# Patient Record
Sex: FEMALE | Race: WHITE | NOT HISPANIC OR LATINO | URBAN - METROPOLITAN AREA
[De-identification: names, ages, dates, MRNs, and addresses within clinical notes are randomized per-mention and may not be internally consistent; named-entity substitution may affect disease eponyms.]

---

## 2017-03-25 ENCOUNTER — INPATIENT (INPATIENT)
Facility: HOSPITAL | Age: 32
LOS: 5 days | Discharge: HOME | End: 2017-03-31
Attending: PSYCHIATRY & NEUROLOGY | Admitting: PSYCHIATRY & NEUROLOGY
Payer: COMMERCIAL

## 2017-03-25 VITALS
DIASTOLIC BLOOD PRESSURE: 103 MMHG | SYSTOLIC BLOOD PRESSURE: 148 MMHG | RESPIRATION RATE: 18 BRPM | OXYGEN SATURATION: 100 % | TEMPERATURE: 98 F

## 2017-03-25 DIAGNOSIS — F29 UNSPECIFIED PSYCHOSIS NOT DUE TO A SUBSTANCE OR KNOWN PHYSIOLOGICAL CONDITION: ICD-10-CM

## 2017-03-25 LAB
ALBUMIN SERPL ELPH-MCNC: 4.5 G/DL — SIGNIFICANT CHANGE UP (ref 3.3–5)
ALP SERPL-CCNC: 45 U/L — SIGNIFICANT CHANGE UP (ref 40–120)
ALT FLD-CCNC: 12 U/L — SIGNIFICANT CHANGE UP (ref 4–33)
AMPHET UR-MCNC: NEGATIVE — SIGNIFICANT CHANGE UP
APAP SERPL-MCNC: < 15 UG/ML — LOW (ref 15–25)
APPEARANCE UR: SIGNIFICANT CHANGE UP
AST SERPL-CCNC: 15 U/L — SIGNIFICANT CHANGE UP (ref 4–32)
BACTERIA # UR AUTO: SIGNIFICANT CHANGE UP
BARBITURATES MEASUREMENT: NEGATIVE — SIGNIFICANT CHANGE UP
BARBITURATES UR SCN-MCNC: NEGATIVE — SIGNIFICANT CHANGE UP
BASOPHILS # BLD AUTO: 0 K/UL — SIGNIFICANT CHANGE UP (ref 0–0.2)
BASOPHILS NFR BLD AUTO: 0 % — SIGNIFICANT CHANGE UP (ref 0–2)
BENZODIAZ SERPL-MCNC: NEGATIVE — SIGNIFICANT CHANGE UP
BENZODIAZ UR-MCNC: POSITIVE — SIGNIFICANT CHANGE UP
BILIRUB SERPL-MCNC: 0.4 MG/DL — SIGNIFICANT CHANGE UP (ref 0.2–1.2)
BILIRUB UR-MCNC: NEGATIVE — SIGNIFICANT CHANGE UP
BLOOD UR QL VISUAL: NEGATIVE — SIGNIFICANT CHANGE UP
BUN SERPL-MCNC: 8 MG/DL — SIGNIFICANT CHANGE UP (ref 7–23)
CALCIUM SERPL-MCNC: 9.1 MG/DL — SIGNIFICANT CHANGE UP (ref 8.4–10.5)
CANNABINOIDS UR-MCNC: NEGATIVE — SIGNIFICANT CHANGE UP
CHLORIDE SERPL-SCNC: 105 MMOL/L — SIGNIFICANT CHANGE UP (ref 98–107)
CO2 SERPL-SCNC: 23 MMOL/L — SIGNIFICANT CHANGE UP (ref 22–31)
COCAINE METAB.OTHER UR-MCNC: NEGATIVE — SIGNIFICANT CHANGE UP
COLOR SPEC: YELLOW — SIGNIFICANT CHANGE UP
CREAT SERPL-MCNC: 0.73 MG/DL — SIGNIFICANT CHANGE UP (ref 0.5–1.3)
EOSINOPHIL # BLD AUTO: 0.01 K/UL — SIGNIFICANT CHANGE UP (ref 0–0.5)
EOSINOPHIL NFR BLD AUTO: 0.1 % — SIGNIFICANT CHANGE UP (ref 0–6)
ETHANOL BLD-MCNC: < 10 MG/DL — SIGNIFICANT CHANGE UP
GLUCOSE SERPL-MCNC: 107 MG/DL — HIGH (ref 70–99)
GLUCOSE UR-MCNC: 50 — SIGNIFICANT CHANGE UP
HCG SERPL-ACNC: < 5 MIU/ML — SIGNIFICANT CHANGE UP
HCT VFR BLD CALC: 41.7 % — SIGNIFICANT CHANGE UP (ref 34.5–45)
HGB BLD-MCNC: 14.7 G/DL — SIGNIFICANT CHANGE UP (ref 11.5–15.5)
IMM GRANULOCYTES NFR BLD AUTO: 0.1 % — SIGNIFICANT CHANGE UP (ref 0–1.5)
KETONES UR-MCNC: SIGNIFICANT CHANGE UP
LEUKOCYTE ESTERASE UR-ACNC: HIGH
LYMPHOCYTES # BLD AUTO: 0.98 K/UL — LOW (ref 1–3.3)
LYMPHOCYTES # BLD AUTO: 13.8 % — SIGNIFICANT CHANGE UP (ref 13–44)
MCHC RBC-ENTMCNC: 30.8 PG — SIGNIFICANT CHANGE UP (ref 27–34)
MCHC RBC-ENTMCNC: 35.3 % — SIGNIFICANT CHANGE UP (ref 32–36)
MCV RBC AUTO: 87.4 FL — SIGNIFICANT CHANGE UP (ref 80–100)
METHADONE UR-MCNC: NEGATIVE — SIGNIFICANT CHANGE UP
MONOCYTES # BLD AUTO: 0.69 K/UL — SIGNIFICANT CHANGE UP (ref 0–0.9)
MONOCYTES NFR BLD AUTO: 9.7 % — SIGNIFICANT CHANGE UP (ref 2–14)
MUCOUS THREADS # UR AUTO: SIGNIFICANT CHANGE UP
NEUTROPHILS # BLD AUTO: 5.4 K/UL — SIGNIFICANT CHANGE UP (ref 1.8–7.4)
NEUTROPHILS NFR BLD AUTO: 76.3 % — SIGNIFICANT CHANGE UP (ref 43–77)
NITRITE UR-MCNC: NEGATIVE — SIGNIFICANT CHANGE UP
OPIATES UR-MCNC: NEGATIVE — SIGNIFICANT CHANGE UP
OXYCODONE UR-MCNC: NEGATIVE — SIGNIFICANT CHANGE UP
PCP UR-MCNC: NEGATIVE — SIGNIFICANT CHANGE UP
PH UR: 6 — SIGNIFICANT CHANGE UP (ref 4.6–8)
PLATELET # BLD AUTO: 216 K/UL — SIGNIFICANT CHANGE UP (ref 150–400)
PMV BLD: 10.6 FL — SIGNIFICANT CHANGE UP (ref 7–13)
POTASSIUM SERPL-MCNC: 4 MMOL/L — SIGNIFICANT CHANGE UP (ref 3.5–5.3)
POTASSIUM SERPL-SCNC: 4 MMOL/L — SIGNIFICANT CHANGE UP (ref 3.5–5.3)
PROT SERPL-MCNC: 6.8 G/DL — SIGNIFICANT CHANGE UP (ref 6–8.3)
PROT UR-MCNC: 20 — SIGNIFICANT CHANGE UP
RBC # BLD: 4.77 M/UL — SIGNIFICANT CHANGE UP (ref 3.8–5.2)
RBC # FLD: 12.1 % — SIGNIFICANT CHANGE UP (ref 10.3–14.5)
RBC CASTS # UR COMP ASSIST: HIGH (ref 0–?)
SALICYLATES SERPL-MCNC: < 5 MG/DL — LOW (ref 15–30)
SODIUM SERPL-SCNC: 140 MMOL/L — SIGNIFICANT CHANGE UP (ref 135–145)
SP GR SPEC: 1.02 — SIGNIFICANT CHANGE UP (ref 1–1.03)
SQUAMOUS # UR AUTO: SIGNIFICANT CHANGE UP
TSH SERPL-MCNC: 2.19 UIU/ML — SIGNIFICANT CHANGE UP (ref 0.27–4.2)
UROBILINOGEN FLD QL: NORMAL E.U. — SIGNIFICANT CHANGE UP (ref 0.1–0.2)
WBC # BLD: 7.09 K/UL — SIGNIFICANT CHANGE UP (ref 3.8–10.5)
WBC # FLD AUTO: 7.09 K/UL — SIGNIFICANT CHANGE UP (ref 3.8–10.5)
WBC UR QL: HIGH (ref 0–?)

## 2017-03-25 PROCEDURE — 70450 CT HEAD/BRAIN W/O DYE: CPT | Mod: 26

## 2017-03-25 PROCEDURE — 99285 EMERGENCY DEPT VISIT HI MDM: CPT

## 2017-03-25 RX ORDER — HALOPERIDOL DECANOATE 100 MG/ML
5 INJECTION INTRAMUSCULAR EVERY 6 HOURS
Qty: 0 | Refills: 0 | Status: DISCONTINUED | OUTPATIENT
Start: 2017-03-25 | End: 2017-03-31

## 2017-03-25 RX ORDER — ALPRAZOLAM 0.25 MG
1 TABLET ORAL
Qty: 0 | Refills: 0 | COMMUNITY

## 2017-03-25 RX ORDER — NITROFURANTOIN MACROCRYSTAL 50 MG
100 CAPSULE ORAL ONCE
Qty: 0 | Refills: 0 | Status: DISCONTINUED | OUTPATIENT
Start: 2017-03-25 | End: 2017-03-25

## 2017-03-25 RX ORDER — DIPHENHYDRAMINE HCL 50 MG
50 CAPSULE ORAL EVERY 6 HOURS
Qty: 0 | Refills: 0 | Status: DISCONTINUED | OUTPATIENT
Start: 2017-03-25 | End: 2017-03-31

## 2017-03-25 RX ORDER — HALOPERIDOL DECANOATE 100 MG/ML
5 INJECTION INTRAMUSCULAR ONCE
Qty: 0 | Refills: 0 | Status: COMPLETED | OUTPATIENT
Start: 2017-03-25 | End: 2017-03-25

## 2017-03-25 RX ORDER — NITROFURANTOIN MACROCRYSTAL 50 MG
100 CAPSULE ORAL
Qty: 0 | Refills: 0 | Status: DISCONTINUED | OUTPATIENT
Start: 2017-03-25 | End: 2017-03-31

## 2017-03-25 RX ORDER — RISPERIDONE 4 MG/1
0.5 TABLET ORAL
Qty: 0 | Refills: 0 | Status: DISCONTINUED | OUTPATIENT
Start: 2017-03-25 | End: 2017-03-28

## 2017-03-25 RX ADMIN — RISPERIDONE 0.5 MILLIGRAM(S): 4 TABLET ORAL at 21:16

## 2017-03-25 RX ADMIN — Medication 50 MILLIGRAM(S): at 21:15

## 2017-03-25 RX ADMIN — HALOPERIDOL DECANOATE 5 MILLIGRAM(S): 100 INJECTION INTRAMUSCULAR at 09:57

## 2017-03-25 RX ADMIN — Medication 1 MILLIGRAM(S): at 09:57

## 2017-03-25 NOTE — ED PROVIDER NOTE - MEDICAL DECISION MAKING DETAILS
30 y/o F hx  Anxiety, Depression  Labs, Urine Tox/UA, HCG, EKG .No evidence of physical injuries, broken skin or deformities. Moderate Leuks on UA w c/o urinary frequency. Will cover with Macrobid 100 mg BID. Hydration encouraged. Urine culture.   Medical evaluation performed. There is no clinical evidence of intoxication or any acute medical problem requiring immediate intervention. Patient is awaiting psychiatric consultation. Final disposition will be determined by psychiatrist.

## 2017-03-25 NOTE — ED BEHAVIORAL HEALTH ASSESSMENT NOTE - DESCRIPTION
patient arrive agitated, unable to be redirected, yelling, required prn meds finished college last year, some financial stress, 2 sisters, half sister , good relations, not in current relationship. none acute finished college last year, some financial stress, 2 sisters, half sister , good relations, not in current relationship. Lives with 2 cats patient arrived agitated, unable to be redirected, yelling, required prn meds

## 2017-03-25 NOTE — ED BEHAVIORAL HEALTH ASSESSMENT NOTE - SUMMARY
31 year old female, works as a , college graduate, lives alone, single with no children, past psych history of anxiety/depression, no prior inpt hospitalizations or suicide attempts, no formal substance abuse treatment, occasional cannabis use and etoh use, no legal history or history of violence, no significant medical history, brought in by family for worsening insomnia, hyperreligious, and acute onset delusions, believing she has special abilities.   Patient presents with less than a month of worsening insomnia, hyper Temple and spirituality, grandiosity, delusional thinking, not in context of substance, c/w with acute psychosis. Patient has good premorbid functioning and given history of a mood disorder, can not rule out MDD with psychosis or BPAD with psychosis at this time. Patient has impaired insight and judgement with directly influences her behavior and functioning, demonstrating inability to care for self and dangerous to others( as also seen witnessed on arrival requiring medications for stabilization). Family is concerned for safety and understands treatment plan for admission.

## 2017-03-25 NOTE — ED BEHAVIORAL HEALTH ASSESSMENT NOTE - PSYCHIATRIC ISSUES AND PLAN (INCLUDE STANDING AND PRN MEDICATION)
psychosis- Risperdal 0.5mg BID, PRNS: haldol 5mg, ativan 2mg, diphenhydramine 50mg, PO/IM, Q6H for Agitation

## 2017-03-25 NOTE — ED PROVIDER NOTE - OBJECTIVE STATEMENT
32 y/o F hx  Anxiety, Depression BIB family w c/o worsening insomnia and delusions. Patient who appears paranoid , suddenly started to yell, scream and seen running away from NP during interview.

## 2017-03-25 NOTE — ED BEHAVIORAL HEALTH ASSESSMENT NOTE - OTHER PAST PSYCHIATRIC HISTORY (INCLUDE DETAILS REGARDING ONSET, COURSE OF ILLNESS, INPATIENT/OUTPATIENT TREATMENT)
Hx of Anxiety/depression   No prior inpt psych history  no hx of suicide attempts Hx of Anxiety/depression   No prior inpt psych history  hx of outpatient treatment while in college (see  note)   no hx of suicide attempts

## 2017-03-25 NOTE — ED BEHAVIORAL HEALTH ASSESSMENT NOTE - OTHER
sister family psychosis voluntary shaking loud at times unable to remember details from events from this past week

## 2017-03-25 NOTE — ED ADULT TRIAGE NOTE - CHIEF COMPLAINT QUOTE
Hx of schizophrenia. Co insomnia for unknown length of time. Anxious and shaking in triage. Hx of schizophrenia. Co insomnia for unknown length of time. Anxious and shaking in triage. Denies drug or alcohol use. States she has had suicidal thoughts in the past, but currently does not. Denies HI. Hx of schizophrenia. Co insomnia for unknown length of time. Anxious and shaking in triage. States " I think I need an exorcism".  Denies drug or alcohol use. States she has had suicidal thoughts in the past, but currently does not. Denies HI.

## 2017-03-25 NOTE — ED ADULT NURSE NOTE - CHIEF COMPLAINT QUOTE
Hx of schizophrenia. Co insomnia for unknown length of time. Anxious and shaking in triage. States " I think I need an exorcism".  Denies drug or alcohol use. States she has had suicidal thoughts in the past, but currently does not. Denies HI.

## 2017-03-25 NOTE — ED ADULT NURSE NOTE - OBJECTIVE STATEMENT
Pt received in .  Presents with delusions and paranoia, easily agitated, conversing with self.  Information obtained from sisters, Karen Hensley, and Kanika Petty, who endorse that [facility] and prescribed Xanax 0.25 mg, 21 pills, and pt has taken 6 pills since 1600h yesterday.  Pt noted to be trembling, screaming at times.  Security wand used and clothing secured. Continue to monitor.

## 2017-03-25 NOTE — ED PROVIDER NOTE - CARE PLAN
Principal Discharge DX:	Unspecified psychosis not due to a substance or known physiological condition  Secondary Diagnosis:	UTI (urinary tract infection)

## 2017-03-25 NOTE — ED BEHAVIORAL HEALTH ASSESSMENT NOTE - RISK ASSESSMENT
Risk factors: impulsivity,  acute psychosis, absence of outpatient follow-up, limited insight into symptoms,  ow frustration tolerance.   Protective factors: Pt denies any active suicidal ideation/intent/plan, no hx of prior attempts, no hospitalizations, no self-harm behaviors, identifies reasons for living, future oriented, supportive social network or family, engaged in work,     Based on risk assessment evaluation, Pt DOES appear to be at imminent risk of harm to self or others at this time. Inpatient hospitalization is indicated at this time for acute stabilization. Family cites understanding of plan and in agreement.

## 2017-03-25 NOTE — ED BEHAVIORAL HEALTH NOTE - BEHAVIORAL HEALTH NOTE
Spoke with patient's sisters who accompanied patient to ED for collateral: Lisandra (full sister, [phone]), Kanika (half-sister, [phone]). She is close with both of them. Patient lives alone, works as a . Finished college last year but it took her a long time. She has a history of depression and anxiety in early college, she saw psychiatrist and therapist at that time was given zoloft and seroquel, unknown when she stopped. Does not have a psychiatrist now. Do not know if she has a therapist but she has been talking about a Meredith Genao who is a therapist or an energy healer. Lisandra states that patient has been complaining of insomnia and anxiety earlier in the week but was not until yesterday that she realized patient was delusional. Patient called her last week saying she could not sleep and asking for tips to get sleep, sister recommended meditation yonny. Patient has been concerned about a spirit in her apartment, apparently has been going to [employer]/tarot card readers. She has been staying with her friend Pinky some nights, unclear for how long this has been going on. Lisandra checked on patient again yesterday via text message and patient said she was not well and needed to go to [facility]. There, they prescribed her xanax 0.25mg 21 tablets. They think she took 6 tablets since yesterday when it was prescribed to her. She then came to Lisandra last night around 8pm  and she seemed out of it and incoherent. She was not making any sense. She has never seen patient like this. Patient was never hospitalized. Never was delusional. She was sexually preoccupied stating that she could "feel her mother getting raped." She stated that she went to a [employer] reader day prior or day before that who told her she has a "gift" and that a spirit has entered her. Patient did not make suicidal or homicidal statements and was not aggressive. They don't know of any drug use. She drinks alcohol, they are unsure how much. Yesterday patient woke Lisandra up in the middle of the night saying, "why do you want me to die?" and "I need to die to save mom's cancer." Mother does not have cancer. Mother actually visited patient one week ago and stayed with her from [place] and did not notice any abnormal behaviors.     They don't know of any acute stressors. She has ongoing stressors like being financially stretched and not having a career or any direction. She is not in a relationship.     Family history is significant for mother with untreated depression and an older sister Marilin possibly with bipolar versus schizophrenia, but they don't know any details about her.

## 2017-03-25 NOTE — ED BEHAVIORAL HEALTH ASSESSMENT NOTE - HPI (INCLUDE ILLNESS QUALITY, SEVERITY, DURATION, TIMING, CONTEXT, MODIFYING FACTORS, ASSOCIATED SIGNS AND SYMPTOMS)
I need to die to save moms cancer  "Rain turner possessed me" 31 year old female, works as a , college graduate, lives alone, single with no children, past psych history of anxiety/depression, no prior inpt hospitalizations or suicide attempts, no formal substance abuse treatment, occasional cannabis use and etoh use, no legal history or history of violence, no significant medical history, brought in by family for worsening insomnia, hyperreligious, and acute onset delusions, believing she has special abilities.     On arrival, patient agitated, screaming, yelling, required prn medications for stabilization. Patient examined when calm and cooperative. Patient states that she is a medium, and asks if the [place] Medium is in the hospital. Patient begins voluntary saying "im convulsing". She continues to talk about how she is a channeler, and she channels energy. "We are all energy". Patient would abruptly stop speaking mid sentence, look at writer, and say "the power of elisa compels you". She has not slept in a week, "because of work", however she says that she has no desire to sleep and "listens to zulay pan" persistently. She is preoccupied with Zulay Pan, "he teaches love". She also believes [facility]en place is haunted, "because of my cats". She was unable to explain what signs from her cats made her believe her place is possessed. She denies recent illicit drug use, last drink of etoh "thursday", however thinks its March Friday the 13th "year of the devil". When writer asked about this, she said "nevermind". Other unsolicited statements she would make are "im a source of energy" and "I can still feel the wind".   "Rain turner possessed me" 31 year old female, works as a , college graduate, lives alone, single with no children, past psych history of anxiety/depression, no prior inpt hospitalizations or suicide attempts, no formal substance abuse treatment, occasional cannabis use and etoh use, no legal history or history of violence, no significant medical history, brought in by family for worsening insomnia, hyperreligious, and acute onset delusions, believing she has special abilities.     On arrival, patient agitated, screaming, yelling, required prn medications for stabilization. Patient examined when calm and cooperative, with no pressure speech or flight of idea/looseness of associations.  Patient states that she is a medium, and asks if the [place] Medium is in the hospital. Patient begins voluntary saying "im convulsing". She continues to talk about how she is a channeler, and she channels energy. "We are all energy". Patient would abruptly stop speaking mid sentence, look at writer, and say "the power of True compels you". She has not slept in a week, "because of work", however she says that she has no desire to sleep and "listens to Dwain pan" persistently. She is preoccupied with [employer], "he teaches love". She also believes her place is haunted, "because of my cats". She was unable to explain what signs from her cats made her believe her place is possessed. Patient explains that she also went to a tarot  who told her that someone may put a spell on her, and she believes her "Core energetics therapist, Ana Genao" did this. She denies recent illicit drug use, last drink of etoh "thursday", however thinks its March Friday the 13th "year of the devil". When writer asked about this, she said "nevermind". Other unsolicited statements she would make are "im a source of energy" and "I can still feel the wind".  She denies AVH. She denies any other increase in activities, or extreme expenditures.  Several minutes after evaluation, writer heard patient scream "get out" however no one was in the room.     Per Collateral (SEE  FOR MORE INFO)- Phone Call- Sister Lisandra states that patient has been complaining of insomnia and anxiety earlier in the week but was not until yesterday that she realized patient was delusional. Patient called her last week saying she could not sleep and asking for tips to get sleep, sister recommended meditation yonny. Patient has been concerned about a spirit in her apartment, apparently has been going to [employer] card readers. She has been staying with her friend Pinky some nights, unclear for how long this has been going on. Lisandra checked on patient again yesterday via text message and patient said she was not well and needed to go to [facility]. There, they prescribed her xanax 0.25mg 21 tablets. They think she took 6 tablets since yesterday when it was prescribed to her. She then came to Lisandra last night around 8pm  and she seemed out of it and incoherent. She was not making any sense. She has never seen patient like this. Patient was never hospitalized. Never was delusional. She was sexually preoccupied stating that she could "feel her mother getting raped." She stated that she went to a [employer] reader day prior or day before that who told her she has a "gift" and that a spirit has entered her. Patient did not make suicidal or homicidal statements and was not aggressive. They don't know of any drug use. She drinks alcohol, they are unsure how much. Yesterday patient woke Lisandra up in the middle of the night saying, "why do you want me to die?" and "I need to die to save mom's cancer." Mother does not have cancer. Mother actually visited patient one week ago and stayed with her from [place] and did not notice any abnormal behaviors. 31 year old female, works as a , college graduate, lives alone, single with no children, past psych history of anxiety/depression, no prior inpt hospitalizations or suicide attempts, no formal substance abuse treatment, occasional cannabis use and etoh use, no legal history or history of violence, no significant medical history, brought in by family for worsening insomnia, hyperreligious, and acute onset delusions, believing she has special abilities.     On arrival, patient agitated, screaming, yelling, required prn medications for stabilization.  UDS + Benzos (recently prescribed by doctor). BAL negative. Ct Head negative for acute pathology.  Patient examined when calm and cooperative, with no pressure speech or flight of idea/looseness of associations.  Patient states that she is a medium, and asks if the [place] Medium is in the hospital. Patient begins voluntary saying "im convulsing". She continues to talk about how she is a channeler, and she channels energy. "We are all energy". Patient would abruptly stop speaking mid sentence, look at writer, and say "the power of True compels you". She has not slept in a week, "because of work", however she says that she has no desire to sleep and "listens to Dwain pan" persistently. She is preoccupied with [employer], "he teaches love". She also believes her place is haunted, "because of my cats". She was unable to explain what signs from her cats made her believe her place is possessed. Patient explains that she also went to a [employer]  who told her that someone may put a spell on her, and she believes her "Core energetics therapist, Ana Genao" did this. She denies recent illicit drug use, last drink of etoh "thursday", however thinks its March Friday the 13th "year of the devil". When writer asked about this, she said "nevermind". Other unsolicited statements she would make are "im a source of energy" and "I can still feel the wind".  She denies AVH. She denies any other increase in activities, or extreme expenditures.  Several minutes after evaluation, writer heard patient scream "get out" however no one was in the room.     Per Collateral (SEE  FOR MORE INFO)- Phone Call- Sister Lisandra states that patient has been complaining of insomnia and anxiety earlier in the week but was not until yesterday that she realized patient was delusional. Patient called her last week saying she could not sleep and asking for tips to get sleep, sister recommended meditation yonny. Patient has been concerned about a spirit in her apartment, apparently has been going to [employer] card readers. She has been staying with her friend Pinky some nights, unclear for how long this has been going on. Lisandra checked on patient again yesterday via text message and patient said she was not well and needed to go to [facility]. There, they prescribed her xanax 0.25mg 21 tablets. They think she took 6 tablets since yesterday when it was prescribed to her. She then came to Lisandra last night around 8pm  and she seemed out of it and incoherent. She was not making any sense. She has never seen patient like this. Patient was never hospitalized. Never was delusional. She was sexually preoccupied stating that she could "feel her mother getting raped." She stated that she went to a [employer]EL[employer] reader day prior or day before that who told her she has a "gift" and that a spirit has entered her. Patient did not make suicidal or homicidal statements and was not aggressive. They don't know of any drug use. She drinks alcohol, they are unsure how much. Yesterday patient woke Lisandra up in the middle of the night saying, "why do you want me to die?" and "I need to die to save mom's cancer." Mother does not have cancer. Mother actually visited patient one week ago and stayed with her from [place] and did not notice any abnormal behaviors.

## 2017-03-25 NOTE — ED BEHAVIORAL HEALTH ASSESSMENT NOTE - CASE SUMMARY
31 year old employed female, recent college graduate last year, lives alone, with past psychiatric history of anxiety and depression during college treated with zoloft and seroquel, not currently in active treatment with psychiatrist who was brought in by sisters for psychotic symptoms in context of insomnia and severe anxiety over the past week. Here patient is delusional, psychomotor agitated, labile and was given PO haldol and ativan prn for symptoms. Recently prescribed xanax from urgent care day prior to presentation, unknown how contributing to picture. Given new onset of psychotic symptoms as well as the severity, patient will require inpatient hospitalization for stabilization and safety.

## 2017-03-25 NOTE — ED BEHAVIORAL HEALTH ASSESSMENT NOTE - CURRENT MEDICATION
xanax 0.25mg (prescribed from urgent care 5/24/2017) xanax 0.25mg (prescribed from urgent care 3/24/2017)

## 2017-03-25 NOTE — ED ADULT NURSE REASSESSMENT NOTE - NS ED NURSE REASSESS COMMENT FT1
15:00-Patient admitted to [facility], medical clearance complete, report given to George RN, pt currently awaiting EMS transport.
16:15-Patient transported to [place] [id number] via EMS, safety maintained.

## 2017-03-26 PROCEDURE — 99223 1ST HOSP IP/OBS HIGH 75: CPT

## 2017-03-26 RX ADMIN — RISPERIDONE 0.5 MILLIGRAM(S): 4 TABLET ORAL at 09:48

## 2017-03-26 RX ADMIN — Medication 2 MILLIGRAM(S): at 22:43

## 2017-03-26 RX ADMIN — Medication 2 MILLIGRAM(S): at 01:10

## 2017-03-26 RX ADMIN — RISPERIDONE 0.5 MILLIGRAM(S): 4 TABLET ORAL at 20:31

## 2017-03-26 RX ADMIN — Medication 50 MILLIGRAM(S): at 20:31

## 2017-03-26 RX ADMIN — Medication 100 MILLIGRAM(S): at 17:22

## 2017-03-26 RX ADMIN — Medication 100 MILLIGRAM(S): at 09:47

## 2017-03-27 PROCEDURE — 99232 SBSQ HOSP IP/OBS MODERATE 35: CPT | Mod: 25

## 2017-03-27 PROCEDURE — 90853 GROUP PSYCHOTHERAPY: CPT

## 2017-03-27 RX ORDER — SERTRALINE 25 MG/1
50 TABLET, FILM COATED ORAL DAILY
Qty: 0 | Refills: 0 | Status: DISCONTINUED | OUTPATIENT
Start: 2017-03-27 | End: 2017-03-29

## 2017-03-27 RX ADMIN — Medication 100 MILLIGRAM(S): at 09:03

## 2017-03-27 RX ADMIN — SERTRALINE 50 MILLIGRAM(S): 25 TABLET, FILM COATED ORAL at 17:06

## 2017-03-27 RX ADMIN — RISPERIDONE 0.5 MILLIGRAM(S): 4 TABLET ORAL at 09:03

## 2017-03-27 RX ADMIN — RISPERIDONE 0.5 MILLIGRAM(S): 4 TABLET ORAL at 20:52

## 2017-03-27 RX ADMIN — Medication 100 MILLIGRAM(S): at 17:00

## 2017-03-28 PROCEDURE — 99232 SBSQ HOSP IP/OBS MODERATE 35: CPT | Mod: 25

## 2017-03-28 PROCEDURE — 90853 GROUP PSYCHOTHERAPY: CPT

## 2017-03-28 RX ORDER — RISPERIDONE 4 MG/1
1 TABLET ORAL AT BEDTIME
Qty: 0 | Refills: 0 | Status: DISCONTINUED | OUTPATIENT
Start: 2017-03-28 | End: 2017-03-29

## 2017-03-28 RX ORDER — TRAZODONE HCL 50 MG
50 TABLET ORAL AT BEDTIME
Qty: 0 | Refills: 0 | Status: DISCONTINUED | OUTPATIENT
Start: 2017-03-28 | End: 2017-03-28

## 2017-03-28 RX ORDER — LANOLIN ALCOHOL/MO/W.PET/CERES
3 CREAM (GRAM) TOPICAL AT BEDTIME
Qty: 0 | Refills: 0 | Status: DISCONTINUED | OUTPATIENT
Start: 2017-03-28 | End: 2017-03-31

## 2017-03-28 RX ADMIN — Medication 50 MILLIGRAM(S): at 02:00

## 2017-03-28 RX ADMIN — RISPERIDONE 0.5 MILLIGRAM(S): 4 TABLET ORAL at 11:16

## 2017-03-28 RX ADMIN — Medication 100 MILLIGRAM(S): at 17:38

## 2017-03-28 RX ADMIN — Medication 100 MILLIGRAM(S): at 11:16

## 2017-03-28 RX ADMIN — RISPERIDONE 1 MILLIGRAM(S): 4 TABLET ORAL at 21:39

## 2017-03-28 RX ADMIN — Medication 3 MILLIGRAM(S): at 23:04

## 2017-03-28 RX ADMIN — SERTRALINE 50 MILLIGRAM(S): 25 TABLET, FILM COATED ORAL at 11:16

## 2017-03-28 RX ADMIN — Medication 50 MILLIGRAM(S): at 21:39

## 2017-03-29 PROCEDURE — 99232 SBSQ HOSP IP/OBS MODERATE 35: CPT

## 2017-03-29 RX ORDER — SERTRALINE 25 MG/1
100 TABLET, FILM COATED ORAL DAILY
Qty: 0 | Refills: 0 | Status: DISCONTINUED | OUTPATIENT
Start: 2017-03-29 | End: 2017-03-31

## 2017-03-29 RX ADMIN — Medication 3 MILLIGRAM(S): at 22:46

## 2017-03-29 RX ADMIN — Medication 100 MILLIGRAM(S): at 16:54

## 2017-03-29 RX ADMIN — Medication 100 MILLIGRAM(S): at 09:00

## 2017-03-29 RX ADMIN — SERTRALINE 50 MILLIGRAM(S): 25 TABLET, FILM COATED ORAL at 09:00

## 2017-03-30 PROCEDURE — 99232 SBSQ HOSP IP/OBS MODERATE 35: CPT

## 2017-03-30 RX ORDER — LANOLIN ALCOHOL/MO/W.PET/CERES
1 CREAM (GRAM) TOPICAL
Qty: 30 | Refills: 0 | OUTPATIENT
Start: 2017-03-30 | End: 2017-04-29

## 2017-03-30 RX ORDER — SERTRALINE 25 MG/1
1 TABLET, FILM COATED ORAL
Qty: 30 | Refills: 0 | OUTPATIENT
Start: 2017-03-30 | End: 2017-04-29

## 2017-03-30 RX ADMIN — Medication 3 MILLIGRAM(S): at 21:27

## 2017-03-30 RX ADMIN — Medication 100 MILLIGRAM(S): at 11:26

## 2017-03-30 RX ADMIN — Medication 2 MILLIGRAM(S): at 01:10

## 2017-03-30 RX ADMIN — SERTRALINE 100 MILLIGRAM(S): 25 TABLET, FILM COATED ORAL at 11:26

## 2017-03-31 VITALS — SYSTOLIC BLOOD PRESSURE: 119 MMHG | HEART RATE: 84 BPM | TEMPERATURE: 37 F | DIASTOLIC BLOOD PRESSURE: 79 MMHG

## 2017-03-31 PROCEDURE — 99238 HOSP IP/OBS DSCHRG MGMT 30/<: CPT

## 2017-03-31 RX ADMIN — Medication 50 MILLIGRAM(S): at 00:50

## 2017-03-31 RX ADMIN — SERTRALINE 100 MILLIGRAM(S): 25 TABLET, FILM COATED ORAL at 08:41

## 2017-03-31 RX ADMIN — Medication 100 MILLIGRAM(S): at 08:41

## 2017-04-02 ENCOUNTER — INPATIENT (INPATIENT)
Facility: HOSPITAL | Age: 32
LOS: 8 days | Discharge: HOME | End: 2017-04-11
Attending: PSYCHIATRY & NEUROLOGY | Admitting: PSYCHIATRY & NEUROLOGY
Payer: COMMERCIAL

## 2017-04-02 VITALS
OXYGEN SATURATION: 100 % | DIASTOLIC BLOOD PRESSURE: 88 MMHG | SYSTOLIC BLOOD PRESSURE: 108 MMHG | HEART RATE: 113 BPM | TEMPERATURE: 99 F | RESPIRATION RATE: 22 BRPM

## 2017-04-02 DIAGNOSIS — F41.9 ANXIETY DISORDER, UNSPECIFIED: ICD-10-CM

## 2017-04-02 PROBLEM — F32.9 MAJOR DEPRESSIVE DISORDER, SINGLE EPISODE, UNSPECIFIED: Chronic | Status: ACTIVE | Noted: 2017-03-25

## 2017-04-02 LAB
ALBUMIN SERPL ELPH-MCNC: 4.8 G/DL — SIGNIFICANT CHANGE UP (ref 3.3–5)
ALP SERPL-CCNC: 48 U/L — SIGNIFICANT CHANGE UP (ref 40–120)
ALT FLD-CCNC: 15 U/L — SIGNIFICANT CHANGE UP (ref 4–33)
AMPHET UR-MCNC: SIGNIFICANT CHANGE UP NG/ML
APAP SERPL-MCNC: < 15 UG/ML — LOW (ref 15–25)
APPEARANCE UR: CLEAR — SIGNIFICANT CHANGE UP
AST SERPL-CCNC: 15 U/L — SIGNIFICANT CHANGE UP (ref 4–32)
BACTERIA # UR AUTO: SIGNIFICANT CHANGE UP
BARBITURATES MEASUREMENT: NEGATIVE — SIGNIFICANT CHANGE UP
BARBITURATES UR SCN-MCNC: NEGATIVE — SIGNIFICANT CHANGE UP
BASOPHILS # BLD AUTO: 0 K/UL — SIGNIFICANT CHANGE UP (ref 0–0.2)
BASOPHILS NFR BLD AUTO: 0 % — SIGNIFICANT CHANGE UP (ref 0–2)
BENZODIAZ SERPL-MCNC: NEGATIVE — SIGNIFICANT CHANGE UP
BENZODIAZ UR-MCNC: NEGATIVE — SIGNIFICANT CHANGE UP
BILIRUB SERPL-MCNC: 1 MG/DL — SIGNIFICANT CHANGE UP (ref 0.2–1.2)
BILIRUB UR-MCNC: NEGATIVE — SIGNIFICANT CHANGE UP
BLOOD UR QL VISUAL: NEGATIVE — SIGNIFICANT CHANGE UP
BUN SERPL-MCNC: 8 MG/DL — SIGNIFICANT CHANGE UP (ref 7–23)
CALCIUM SERPL-MCNC: 9.5 MG/DL — SIGNIFICANT CHANGE UP (ref 8.4–10.5)
CANNABINOIDS UR-MCNC: NEGATIVE — SIGNIFICANT CHANGE UP
CHLORIDE SERPL-SCNC: 103 MMOL/L — SIGNIFICANT CHANGE UP (ref 98–107)
CO2 SERPL-SCNC: 15 MMOL/L — LOW (ref 22–31)
COCAINE METAB.OTHER UR-MCNC: NEGATIVE — SIGNIFICANT CHANGE UP
COLOR SPEC: SIGNIFICANT CHANGE UP
CREAT SERPL-MCNC: 0.67 MG/DL — SIGNIFICANT CHANGE UP (ref 0.5–1.3)
EOSINOPHIL # BLD AUTO: 0 K/UL — SIGNIFICANT CHANGE UP (ref 0–0.5)
EOSINOPHIL NFR BLD AUTO: 0 % — SIGNIFICANT CHANGE UP (ref 0–6)
ETHANOL BLD-MCNC: < 10 MG/DL — SIGNIFICANT CHANGE UP
GLUCOSE SERPL-MCNC: 105 MG/DL — HIGH (ref 70–99)
GLUCOSE UR-MCNC: NEGATIVE — SIGNIFICANT CHANGE UP
HCG UR-SCNC: NEGATIVE — SIGNIFICANT CHANGE UP
HCT VFR BLD CALC: 43.1 % — SIGNIFICANT CHANGE UP (ref 34.5–45)
HGB BLD-MCNC: 15.8 G/DL — HIGH (ref 11.5–15.5)
IMM GRANULOCYTES NFR BLD AUTO: 0.1 % — SIGNIFICANT CHANGE UP (ref 0–1.5)
KETONES UR-MCNC: NEGATIVE — SIGNIFICANT CHANGE UP
LEUKOCYTE ESTERASE UR-ACNC: NEGATIVE — SIGNIFICANT CHANGE UP
LYMPHOCYTES # BLD AUTO: 1.18 K/UL — SIGNIFICANT CHANGE UP (ref 1–3.3)
LYMPHOCYTES # BLD AUTO: 15.8 % — SIGNIFICANT CHANGE UP (ref 13–44)
MCHC RBC-ENTMCNC: 31.2 PG — SIGNIFICANT CHANGE UP (ref 27–34)
MCHC RBC-ENTMCNC: 36.7 % — HIGH (ref 32–36)
MCV RBC AUTO: 85 FL — SIGNIFICANT CHANGE UP (ref 80–100)
METHADONE UR-MCNC: NEGATIVE — SIGNIFICANT CHANGE UP
MONOCYTES # BLD AUTO: 0.49 K/UL — SIGNIFICANT CHANGE UP (ref 0–0.9)
MONOCYTES NFR BLD AUTO: 6.6 % — SIGNIFICANT CHANGE UP (ref 2–14)
MUCOUS THREADS # UR AUTO: SIGNIFICANT CHANGE UP
NEUTROPHILS # BLD AUTO: 5.78 K/UL — SIGNIFICANT CHANGE UP (ref 1.8–7.4)
NEUTROPHILS NFR BLD AUTO: 77.5 % — HIGH (ref 43–77)
NITRITE UR-MCNC: NEGATIVE — SIGNIFICANT CHANGE UP
OPIATES UR-MCNC: NEGATIVE — SIGNIFICANT CHANGE UP
OXYCODONE UR-MCNC: NEGATIVE — SIGNIFICANT CHANGE UP
PCP UR-MCNC: NEGATIVE — SIGNIFICANT CHANGE UP
PH UR: 6.5 — SIGNIFICANT CHANGE UP (ref 4.6–8)
PLATELET # BLD AUTO: 253 K/UL — SIGNIFICANT CHANGE UP (ref 150–400)
PMV BLD: 10.5 FL — SIGNIFICANT CHANGE UP (ref 7–13)
POTASSIUM SERPL-MCNC: 3.7 MMOL/L — SIGNIFICANT CHANGE UP (ref 3.5–5.3)
POTASSIUM SERPL-SCNC: 3.7 MMOL/L — SIGNIFICANT CHANGE UP (ref 3.5–5.3)
PROT SERPL-MCNC: 7.4 G/DL — SIGNIFICANT CHANGE UP (ref 6–8.3)
PROT UR-MCNC: NEGATIVE — SIGNIFICANT CHANGE UP
RBC # BLD: 5.07 M/UL — SIGNIFICANT CHANGE UP (ref 3.8–5.2)
RBC # FLD: 11.7 % — SIGNIFICANT CHANGE UP (ref 10.3–14.5)
RBC CASTS # UR COMP ASSIST: SIGNIFICANT CHANGE UP (ref 0–?)
SALICYLATES SERPL-MCNC: < 5 MG/DL — LOW (ref 15–30)
SODIUM SERPL-SCNC: 139 MMOL/L — SIGNIFICANT CHANGE UP (ref 135–145)
SP GR SPEC: 1 — SIGNIFICANT CHANGE UP (ref 1–1.03)
TSH SERPL-MCNC: 1.76 UIU/ML — SIGNIFICANT CHANGE UP (ref 0.27–4.2)
UROBILINOGEN FLD QL: NORMAL E.U. — SIGNIFICANT CHANGE UP (ref 0.1–0.2)
WBC # BLD: 7.46 K/UL — SIGNIFICANT CHANGE UP (ref 3.8–10.5)
WBC # FLD AUTO: 7.46 K/UL — SIGNIFICANT CHANGE UP (ref 3.8–10.5)
WBC UR QL: SIGNIFICANT CHANGE UP (ref 0–?)

## 2017-04-02 PROCEDURE — 99285 EMERGENCY DEPT VISIT HI MDM: CPT

## 2017-04-02 RX ORDER — QUETIAPINE FUMARATE 200 MG/1
25 TABLET, FILM COATED ORAL
Qty: 0 | Refills: 0 | Status: DISCONTINUED | OUTPATIENT
Start: 2017-04-02 | End: 2017-04-03

## 2017-04-02 RX ORDER — QUETIAPINE FUMARATE 200 MG/1
25 TABLET, FILM COATED ORAL EVERY 8 HOURS
Qty: 0 | Refills: 0 | Status: DISCONTINUED | OUTPATIENT
Start: 2017-04-02 | End: 2017-04-03

## 2017-04-02 RX ORDER — DIPHENHYDRAMINE HCL 50 MG
50 CAPSULE ORAL EVERY 6 HOURS
Qty: 0 | Refills: 0 | Status: DISCONTINUED | OUTPATIENT
Start: 2017-04-02 | End: 2017-04-11

## 2017-04-02 RX ADMIN — QUETIAPINE FUMARATE 25 MILLIGRAM(S): 200 TABLET, FILM COATED ORAL at 20:50

## 2017-04-02 NOTE — ED ADULT NURSE NOTE - OBJECTIVE STATEMENT
BIB self from home s/p d/c from [facility] 3/30/17. Pt arrives to  awake, a&ox3, ambulating unassisted, appears slightly anxious but able to verbalize feelings appropriately. Pt denies current s/i h/i or a/v/h. Reports Insomnia x few days r/t recent UTI dx. Pt states "I was not d/c with medication started in the hospital for my uti". Denies drug/etoh use.

## 2017-04-02 NOTE — ED BEHAVIORAL HEALTH ASSESSMENT NOTE - OTHER PAST PSYCHIATRIC HISTORY (INCLUDE DETAILS REGARDING ONSET, COURSE OF ILLNESS, INPATIENT/OUTPATIENT TREATMENT)
Hx of Anxiety/depression   No prior inpt psych history  hx of outpatient treatment while in college (see  note)   no hx of suicide attempts Hx of Anxiety/depression   hx of outpatient treatment while in college (see  note)   no hx of suicide attempts

## 2017-04-02 NOTE — ED BEHAVIORAL HEALTH ASSESSMENT NOTE - CASE SUMMARY
30 yo female seen eval in ER after recent psych admit p/w poor sleep, paranoia and disorganziation and severe anxiety. Unable to function ro be cared for in current condition as an outpatient despite recent d/c inpatient psych on zoloft. Appears to be having manic and psychotic sx and would stop zoloft and start seroquel. Recent d/c. Attempted to contact priro provider at [place] [id number] but no response to pager and no cell phone available on unit. Unable to be maintained on outpatient level of care despite appointment on friday scheduled. Will admit to [facility] 3 on  vol status. No 1:!. Transfer wit hsecurity.

## 2017-04-02 NOTE — ED PROVIDER NOTE - OBJECTIVE STATEMENT
32 y/o F with h/o anxiety, recent admission to [facility] (discharged 3 days ago) here with increasing anxiety and feeling "worried that I have septic shock."  Pt states that she was given macrobid while she was at [facility] for a UTI.  She states she got the antibiotic during her week-long stay, but was not discharged with the medication.  She denies any fever, chills, abd pain, back pain, n/v/d, dysuria, hematuria, urgency, frequency.  Pt reports compliance with sertraline and mealtonin, but difficulty sleeping and feeling anxious and shaky.  No tob, drug, or etoh use.  Pt denies SI/HI, AH/VH.

## 2017-04-02 NOTE — ED ADULT TRIAGE NOTE - CHIEF COMPLAINT QUOTE
Pt arrives st"I have having a severe panic attack was just hospitalized at [facility] for the same. On Melotonin and Sertraline. I have not slept in 3 days." "I was also treated me for UTI but they did not discharge me with antibiotic...I don't really have any urinary symptoms"  Pt denies SI/HI

## 2017-04-02 NOTE — ED BEHAVIORAL HEALTH ASSESSMENT NOTE - HPI (INCLUDE ILLNESS QUALITY, SEVERITY, DURATION, TIMING, CONTEXT, MODIFYING FACTORS, ASSOCIATED SIGNS AND SYMPTOMS)
31 year old female, works as a , college graduate, lives alone, single with no children, past psych history of anxiety/depression, no prior inpt hospitalizations or suicide attempts, no formal substance abuse treatment, occasional cannabis use and etoh use, no legal history or history of violence, no significant medical history, brought in by family for worsening insomnia, hyperreligious, and acute onset delusions, believing she has special abilities.     On arrival, patient is irritable, anxious and pacing. Patient is assessed on a 1:1 in a quit room, she is cooperative although very anxious. Patient reports that since she has been discharged on Friday March 31 she has not been able to sleep, last night patient went with her mother to [facility] and bought 10 mg of Melatonin although this did not help and patient was up all night per her report and moms.  Patient begins voluntary saying "im convulsing". She continues to talk about how she sees lights flashing and says "I'm scared maybe I do have schizophrenia". Patient reports that she has not been able to sleep since discharge and even while on the unit was not sleeping. She denies recent illicit drug use, last drink of etoh "Thursday before discharge". Patient is seen pacing up and down the ER hallway, holding her chest saying "I need my health restored". Patient inappropriately walks into another's patients room, disorganized with poor judgment.  Patient reports getting messages from the TV saying "Its a mix of positive and negative messages and exorcism". Patient is scheduled for an outpatient AOPD appointment this Friday with Dr. Starkey although at this time she is not sleeping, psychotic and pacing, and is in need for stabilization.    Per [facility] (SEE  FOR MORE INFO)- mother Breana Yoon, reports that patient was discharged prematurely, and has not slept since she was discharged. Mom reports that patient "makes sense but doesn't make sense", pacing around the house the entire time and "very anxious". Last night mom went with patient to buy 10 mg Of Melatonin  because the 3 mg "wasn't working". Mom reports "this did not help her at all, she kept pacing all night". Mom reports patient doing odd things such as filling the cats food bowls twice in a very disorganized manner. Patients mom says "my daughter is very sick, she needs help", reporting she is verry irritable and fequently aggitated. Mom wanted to take patient to [facility] which only opens at 9 AM although moms says "I just couldn't wait anymore" and "we wont make it till her appointment on Friday, she needs an antipsychotic". Mom is advocating for inpatient hospitalization saying that her daughter cannot be left alone at this time as she is highly disorganized. 31 year old female, works as a , college graduate, lives alone, single with no children, past psych history of anxiety/depression, no prior inpt hospitalizations or suicide attempts, no formal substance abuse treatment, occasional cannabis use and etoh use, no legal history or history of violence, no significant medical history, brought in by family for worsening insomnia, hyperreligious, and acute onset delusions, believing she has special abilities.     On arrival, patient is irritable, anxious and pacing. Patient is assessed on a 1:1 in a quiet room, she is cooperative although very anxious. Patient reports that since she has been discharged on Friday March 31 she has not been able to sleep, last night patient went with her mother to [facility] and bought 10 mg of Melatonin although this did not help and patient was up all night per her report and moms.  Patient begins voluntary saying "im convulsing". She continues to talk about how she sees lights flashing and says "I'm scared maybe I do have schizophrenia". Patient reports that she has not been able to sleep since discharge and even while on the unit was not sleeping. She denies recent illicit drug use, last drink of etoh "Thursday before discharge". Patient is seen pacing up and down the ER hallway, holding her chest saying "I need my health restored". Patient inappropriately walks into another's patients room, disorganized with poor judgment.  Patient reports getting messages from the TV saying "Its a mix of positive and negative messages and exorcism". Patient is scheduled for an outpatient AOPD appointment this Friday with Dr. Starkey although at this time she is not sleeping, psychotic and pacing, and is in need for stabilization.    Per [facility] (SEE  FOR MORE INFO)- mother Breana Yoon, reports that patient was discharged prematurely, and has not slept since she was discharged. Mom reports that patient "makes sense but doesn't make sense", pacing around the house the entire time and "very anxious". Last night mom went with patient to buy 10 mg Of Melatonin  because the 3 mg "wasn't working". Mom reports "this did not help her at all, she kept pacing all night". Mom reports patient doing odd things such as filling the cats food bowls twice in a very disorganized manner. Patients mom says "my daughter is very sick, she needs help", reporting she is verry irritable and fequently aggitated. Mom wanted to take patient to [facility] which only opens at 9 AM although moms says "I just couldn't wait anymore" and "we wont make it till her appointment on Friday, she needs an antipsychotic". Mom is advocating for inpatient hospitalization saying that her daughter cannot be left alone at this time as she is highly disorganized.

## 2017-04-02 NOTE — ED BEHAVIORAL HEALTH ASSESSMENT NOTE - DESCRIPTION
patient arrived agitated, anxious and pacing none acute finished college last year, some financial stress, 2 sisters, half sister , good relations, not in current relationship. Lives with 2 cats

## 2017-04-02 NOTE — ED ADULT NURSE REASSESSMENT NOTE - NS ED NURSE REASSESS COMMENT FT1
Received report from night RN DY pt calm & cooperative in nad denies Si/Hi/AVh at present eval on going.

## 2017-04-02 NOTE — ED BEHAVIORAL HEALTH ASSESSMENT NOTE - OTHER
family psychosis pacing while holding on her chest latent unable to remember details from events from this past week

## 2017-04-02 NOTE — ED BEHAVIORAL HEALTH ASSESSMENT NOTE - PSYCHIATRIC ISSUES AND PLAN (INCLUDE STANDING AND PRN MEDICATION)
psychosis- Seroquel 25 mg BID, PRNS: haldol 5mg, ativan 2mg, diphenhydramine 50mg, PO/IM, Q6H for Agitation

## 2017-04-02 NOTE — ED ADULT NURSE NOTE - CHIEF COMPLAINT QUOTE
Pt arrives st"I have having a severe panic attack was [facility]. On Melotonin and Sertraline. I have not slept in 3 days." "I was also treated me for UTI but they did not discharge me with antibiotic...I don't really have any urinary symptoms"  Pt denies SI/HI

## 2017-04-02 NOTE — ED BEHAVIORAL HEALTH NOTE - BEHAVIORAL HEALTH NOTE
Patient presents after d/c from [facility] this past Thursday where she states that she was treated for anxiety, She reports persistent panic symptoms, concerned she was not discharged with the anitbiotics for her UTI denies SI or HI, no AH/VH.  States that she has been compliant with her zoloft.  Denies alcohol or illicit substance use.

## 2017-04-02 NOTE — ED PROVIDER NOTE - MEDICAL DECISION MAKING DETAILS
30 y/o F here with increasing anxiety, concerned that she did not receive rx for abx for recently treated UTI.  Pt likely completed course in house, no urinary sxs at present, normal exam.  Plan to check ua/ucg, psych to evaluate.

## 2017-04-02 NOTE — ED BEHAVIORAL HEALTH ASSESSMENT NOTE - DETAILS
mother-untreated depression, sister with ?bipolar or schizophrenia SANDI family Patient was recently discharged from [facility] message left

## 2017-04-02 NOTE — ED BEHAVIORAL HEALTH ASSESSMENT NOTE - SUMMARY
31 year old female, works as a , college graduate, lives alone, single with no children, past psych history of anxiety/depression, no prior inpt hospitalizations or suicide attempts, no formal substance abuse treatment, occasional cannabis use and etoh use, no legal history or history of violence, no significant medical history, brought in by family for worsening insomnia, hyperreligious, and acute onset delusions, believing she has special abilities.   Patient presents with less than a month of worsening insomnia, hyper Sikhism and spirituality, grandiosity, delusional thinking, not in context of substance, c/w with acute psychosis. Patient has good premorbid functioning and given history of a mood disorder, can not rule out MDD with psychosis or BPAD with psychosis at this time. Patient has impaired insight and judgement with directly influences her behavior and functioning, demonstrating inability to care for self and dangerous to others. Family is concerned for safety and understands treatment plan for admission.

## 2017-04-03 LAB
BACTERIA UR CULT: SIGNIFICANT CHANGE UP
SPECIMEN SOURCE: SIGNIFICANT CHANGE UP

## 2017-04-03 PROCEDURE — 90853 GROUP PSYCHOTHERAPY: CPT

## 2017-04-03 RX ORDER — QUETIAPINE FUMARATE 200 MG/1
25 TABLET, FILM COATED ORAL EVERY 8 HOURS
Qty: 0 | Refills: 0 | Status: DISCONTINUED | OUTPATIENT
Start: 2017-04-03 | End: 2017-04-11

## 2017-04-03 RX ORDER — QUETIAPINE FUMARATE 200 MG/1
50 TABLET, FILM COATED ORAL AT BEDTIME
Qty: 0 | Refills: 0 | Status: DISCONTINUED | OUTPATIENT
Start: 2017-04-03 | End: 2017-04-04

## 2017-04-03 RX ORDER — SERTRALINE 25 MG/1
100 TABLET, FILM COATED ORAL DAILY
Qty: 0 | Refills: 0 | Status: DISCONTINUED | OUTPATIENT
Start: 2017-04-03 | End: 2017-04-07

## 2017-04-03 RX ADMIN — QUETIAPINE FUMARATE 50 MILLIGRAM(S): 200 TABLET, FILM COATED ORAL at 20:24

## 2017-04-03 RX ADMIN — QUETIAPINE FUMARATE 25 MILLIGRAM(S): 200 TABLET, FILM COATED ORAL at 08:23

## 2017-04-04 PROCEDURE — 99232 SBSQ HOSP IP/OBS MODERATE 35: CPT

## 2017-04-04 RX ORDER — QUETIAPINE FUMARATE 200 MG/1
100 TABLET, FILM COATED ORAL AT BEDTIME
Qty: 0 | Refills: 0 | Status: DISCONTINUED | OUTPATIENT
Start: 2017-04-04 | End: 2017-04-07

## 2017-04-04 RX ORDER — ACETAMINOPHEN 500 MG
650 TABLET ORAL EVERY 6 HOURS
Qty: 0 | Refills: 0 | Status: DISCONTINUED | OUTPATIENT
Start: 2017-04-04 | End: 2017-04-11

## 2017-04-04 RX ADMIN — QUETIAPINE FUMARATE 100 MILLIGRAM(S): 200 TABLET, FILM COATED ORAL at 20:25

## 2017-04-04 RX ADMIN — SERTRALINE 100 MILLIGRAM(S): 25 TABLET, FILM COATED ORAL at 09:30

## 2017-04-04 RX ADMIN — Medication 650 MILLIGRAM(S): at 23:30

## 2017-04-05 PROCEDURE — 99232 SBSQ HOSP IP/OBS MODERATE 35: CPT

## 2017-04-05 RX ADMIN — Medication 650 MILLIGRAM(S): at 00:36

## 2017-04-05 RX ADMIN — Medication 650 MILLIGRAM(S): at 12:59

## 2017-04-05 RX ADMIN — QUETIAPINE FUMARATE 100 MILLIGRAM(S): 200 TABLET, FILM COATED ORAL at 21:39

## 2017-04-05 RX ADMIN — Medication 650 MILLIGRAM(S): at 13:59

## 2017-04-05 RX ADMIN — SERTRALINE 100 MILLIGRAM(S): 25 TABLET, FILM COATED ORAL at 08:39

## 2017-04-05 RX ADMIN — QUETIAPINE FUMARATE 25 MILLIGRAM(S): 200 TABLET, FILM COATED ORAL at 03:25

## 2017-04-06 PROCEDURE — 99232 SBSQ HOSP IP/OBS MODERATE 35: CPT

## 2017-04-06 RX ADMIN — SERTRALINE 100 MILLIGRAM(S): 25 TABLET, FILM COATED ORAL at 09:01

## 2017-04-06 RX ADMIN — QUETIAPINE FUMARATE 100 MILLIGRAM(S): 200 TABLET, FILM COATED ORAL at 20:49

## 2017-04-07 PROCEDURE — 99232 SBSQ HOSP IP/OBS MODERATE 35: CPT | Mod: 25

## 2017-04-07 RX ORDER — QUETIAPINE FUMARATE 200 MG/1
150 TABLET, FILM COATED ORAL AT BEDTIME
Qty: 0 | Refills: 0 | Status: DISCONTINUED | OUTPATIENT
Start: 2017-04-07 | End: 2017-04-11

## 2017-04-07 RX ORDER — SERTRALINE 25 MG/1
150 TABLET, FILM COATED ORAL DAILY
Qty: 0 | Refills: 0 | Status: DISCONTINUED | OUTPATIENT
Start: 2017-04-07 | End: 2017-04-10

## 2017-04-07 RX ADMIN — QUETIAPINE FUMARATE 150 MILLIGRAM(S): 200 TABLET, FILM COATED ORAL at 21:37

## 2017-04-07 RX ADMIN — SERTRALINE 100 MILLIGRAM(S): 25 TABLET, FILM COATED ORAL at 08:52

## 2017-04-08 RX ADMIN — QUETIAPINE FUMARATE 150 MILLIGRAM(S): 200 TABLET, FILM COATED ORAL at 21:21

## 2017-04-08 RX ADMIN — SERTRALINE 150 MILLIGRAM(S): 25 TABLET, FILM COATED ORAL at 09:39

## 2017-04-09 RX ADMIN — SERTRALINE 150 MILLIGRAM(S): 25 TABLET, FILM COATED ORAL at 09:33

## 2017-04-09 RX ADMIN — QUETIAPINE FUMARATE 150 MILLIGRAM(S): 200 TABLET, FILM COATED ORAL at 21:57

## 2017-04-10 PROCEDURE — 99232 SBSQ HOSP IP/OBS MODERATE 35: CPT

## 2017-04-10 RX ORDER — SERTRALINE 25 MG/1
1 TABLET, FILM COATED ORAL
Qty: 10 | Refills: 0 | OUTPATIENT
Start: 2017-04-10 | End: 2017-04-20

## 2017-04-10 RX ORDER — QUETIAPINE FUMARATE 200 MG/1
3 TABLET, FILM COATED ORAL
Qty: 30 | Refills: 0 | OUTPATIENT
Start: 2017-04-10 | End: 2017-04-20

## 2017-04-10 RX ORDER — SERTRALINE 25 MG/1
125 TABLET, FILM COATED ORAL DAILY
Qty: 0 | Refills: 0 | Status: DISCONTINUED | OUTPATIENT
Start: 2017-04-10 | End: 2017-04-11

## 2017-04-10 RX ADMIN — SERTRALINE 150 MILLIGRAM(S): 25 TABLET, FILM COATED ORAL at 08:22

## 2017-04-10 RX ADMIN — QUETIAPINE FUMARATE 150 MILLIGRAM(S): 200 TABLET, FILM COATED ORAL at 21:36

## 2017-04-11 VITALS — HEART RATE: 76 BPM | DIASTOLIC BLOOD PRESSURE: 63 MMHG | SYSTOLIC BLOOD PRESSURE: 104 MMHG | TEMPERATURE: 98 F

## 2017-04-11 PROCEDURE — 99239 HOSP IP/OBS DSCHRG MGMT >30: CPT

## 2017-04-11 RX ORDER — QUETIAPINE FUMARATE 200 MG/1
3 TABLET, FILM COATED ORAL
Qty: 0 | Refills: 0 | COMMUNITY
Start: 2017-04-11

## 2017-04-11 RX ORDER — QUETIAPINE FUMARATE 200 MG/1
1 TABLET, FILM COATED ORAL
Qty: 0 | Refills: 0 | COMMUNITY
Start: 2017-04-11

## 2017-04-11 RX ORDER — SERTRALINE 25 MG/1
5 TABLET, FILM COATED ORAL
Qty: 0 | Refills: 0 | COMMUNITY
Start: 2017-04-11

## 2017-04-11 RX ADMIN — SERTRALINE 125 MILLIGRAM(S): 25 TABLET, FILM COATED ORAL at 08:30

## 2017-04-12 ENCOUNTER — OUTPATIENT (OUTPATIENT)
Dept: OUTPATIENT SERVICES | Facility: HOSPITAL | Age: 32
LOS: 1 days | Discharge: HOME | End: 2017-04-12

## 2017-04-13 DIAGNOSIS — F41.9 ANXIETY DISORDER, UNSPECIFIED: ICD-10-CM

## 2023-02-03 NOTE — ED ADULT NURSE NOTE - CHIEF COMPLAINT
"ED Positive Culture Follow-up/Notification Note:    Date: 2/2/23     Patient seen in the ED on 1/27/2023 for urinary frequency and pain. Patient states the the pain began 2 hours prior to presentation. Patient denies abdominal or flank pain.   1. Acute UTI       Discharge Medication List as of 1/27/2023  6:32 PM        START taking these medications    Details   nitrofurantoin (MACROBID) 100 MG Cap Take 1 Capsule by mouth 2 times a day for 5 days., Disp-10 Capsule, R-0, Normal      phenazopyridine (PYRIDIUM) 200 MG Tab Take 1 Tablet by mouth 3 times a day as needed for Mild Pain., Disp-6 Tablet, R-0, Normal             Allergies: Other drug, Latex, and Morphine     Vitals:    01/27/23 1458 01/27/23 1503 01/27/23 1831   BP: 95/46  100/50   Pulse: 85  80   Resp: 18  16   Temp: 36.2 °C (97.2 °F)  36.6 °C (97.8 °F)   TempSrc: Temporal  Temporal   SpO2: 97%  95%   Weight:  70.2 kg (154 lb 12.2 oz)    Height:  1.676 m (5' 6\")        Final cultures:   Results       Procedure Component Value Units Date/Time    URINE CULTURE(NEW) [[id number]]  (Abnormal)  (Susceptibility) Collected: 01/27/23 1635    Order Status: Completed Specimen: Urine Updated: 01/29/23 1034     Significant Indicator POS     Source UR     Site -     Culture Result -      Escherichia coli  >100,000 cfu/mL        Streptococcus agalactiae (Group B)  <10,000 cfu/mL      Narrative:      Indication for culture:->Patient WITHOUT an indwelling Espino  catheter in place with new onset of Dysuria, Frequency,  Urgency, and/or Suprapubic pain    Susceptibility       Escherichia coli (1)       Antibiotic Interpretation Microscan   Method Status    Amikacin  [*]  Sensitive <=16 mcg/mL ANITA Final    Ampicillin Resistant >16 mcg/mL ANITA Final    Amoxicillin/CA  [*]  Sensitive <=8/4 mcg/mL ANITA Final    Aztreonam  [*]  Sensitive <=4 mcg/mL ANITA Final    Ceftolozane+Tazobactam  [*]  Sensitive <=2 mcg/mL ANITA Final    Ceftriaxone Sensitive <=1 mcg/mL ANITA Final    Ceftazidime  [*]  " The patient is a 31y Female complaining of insomnia Sensitive <=1 mcg/mL ANITA Final    Cefazolin Sensitive <=2 mcg/mL ANITA Final     Breakpoints when Cefazolin is used for therapy of infections  other than uncomplicated UTIs due to Enterobacterales are as  follows:  ANITA and Interpretation:  <=2 S  4 I  >=8 R         Ciprofloxacin Sensitive <=0.25 mcg/mL ANITA Final    Cefepime Sensitive <=2 mcg/mL ANITA Final    Cefuroxime Sensitive <=4 mcg/mL ANITA Final    Ceftazidime+Avibactam  [*]  Sensitive <=4 mcg/mL ANITA Final    Ampicillin/sulbactam Intermediate 16/8 mcg/mL ANITA Final    Ertapenem  [*]  Sensitive <=0.5 mcg/mL ANITA Final    Tobramycin Sensitive <=2 mcg/mL ANITA Final    Nitrofurantoin Sensitive <=32 mcg/mL ANITA Final    Gentamicin Sensitive <=2 mcg/mL ANITA Final    Imipenem  [*]  Sensitive <=1 mcg/mL ANITA Final    Levofloxacin Sensitive <=0.5 mcg/mL ANITA Final    Meropenem  [*]  Sensitive <=1 mcg/mL ANITA Final    Meropenem/Vaborbactam  [*]  Sensitive <=2 mcg/mL ANITA Final    Minocycline Sensitive <=4 mcg/mL ANITA Final    Pip/Tazobactam Sensitive <=8 mcg/mL ANITA Final    Trimeth/Sulfa Resistant >2/38 mcg/mL ANITA Final    Tetracycline  [*]  Resistant >8 mcg/mL ANITA Final    Tigecycline Sensitive <=2 mcg/mL ANITA Final               [*]  Suppressed Antibiotic                   Urinalysis, Culture if Indicated [[id number]]  (Abnormal) Collected: 01/27/23 1635    Order Status: Completed Specimen: Urine, Clean Catch Updated: 01/27/23 9831     Color Orange     Character Turbid     Specific Gravity 1.037     Ph 5.5     Glucose Negative mg/dL      Ketones Trace mg/dL      Protein 300 mg/dL      Bilirubin Small     Urobilinogen, Urine 1.0     Nitrite Positive     Leukocyte Esterase Moderate     Occult Blood Large     Micro Urine Req Microscopic    Narrative:      Indication for culture:->Patient WITHOUT an indwelling Espino  catheter in place with new onset of Dysuria, Frequency,  Urgency, and/or Suprapubic pain            Plan:   Urine culture growing E. Coli and group B Strep, which is a  typical vaginal colonizer. Patient received Macrobid 100 mg in the ED and subsequently discharged with a prescription for Macrobid 100 mg twice daily for 5 days. Appropriate antibiotic therapy prescribed. No changes required based upon culture result.    Fern Skaggs, PharmD
